# Patient Record
(demographics unavailable — no encounter records)

---

## 2025-03-24 NOTE — PAST MEDICAL HISTORY
[At Term] : at term [ Section] : by  section [Non-reassuring Fetal Status] : non-reassuring fetal status [Failure to Progress] : failure to progress [Age Appropriate] : age appropriate developmental milestones met [FreeTextEntry1] : 7lb 4oz [FreeTextEntry4] : emergency c/s

## 2025-03-24 NOTE — CONSULT LETTER
[Dear  ___] : Dear  [unfilled], [Consult Letter:] : I had the pleasure of evaluating your patient, [unfilled]. [Please see my note below.] : Please see my note below. [Sincerely,] : Sincerely, [FreeTextEntry3] : Paola Álvarez MD

## 2025-03-24 NOTE — PHYSICAL EXAM
[Healthy Appearing] : healthy appearing [Well Nourished] : well nourished [Interactive] : interactive [Normal Appearance] : normal appearance [Well formed] : well formed [Normally Set] : normally set [Normal S1 and S2] : normal S1 and S2 [Clear to Ausculation Bilaterally] : clear to auscultation bilaterally [Abdomen Soft] : soft [Abdomen Tenderness] : non-tender [] : no hepatosplenomegaly [Normal] : normal  [Murmur] : no murmurs [de-identified] : fidgety with his hands

## 2025-03-24 NOTE — HISTORY OF PRESENT ILLNESS
[Constipation] : no constipation [Sweating] : no sweating [Palpitations] : no palpitations [Nervousness] : no nervousness [Fatigue] : no fatigue [Abdominal Pain] : no abdominal pain [Weight Loss] : no weight loss [FreeTextEntry2] : Logan is a 12 year 6 month old boy with no PMH who was referred by his PCP for initial evaluation of abnormal TFTs. Labs were done on 1/18/25 for his annual physical - TSH was 0.36 mIU/L; FT4 1.4 ng/dL. TFTs were repeated on 2/8/25 TSH was 0.07 mIU/L, FT4 1.5 ng/dL. He had strep around the same time in February. Labs done at [facility]. Lipids, CMP, A1C were normal. Growth charts reviewed: weight has been stable around 20-30 percentile, height stable around 20-30 percentile. He denies any symptoms of hypo or hyperthyroidism, namely constipation, diarrhea, weight loss or weight gain, heat or cold intolerance, sweating, nervousness or palpitations or fatigue. PMH: <1 year had benign murmur, hasn't been back since  FH: MGM had thyroid cancer, thyroid removed and takes thyroid meds; no autoimmune disorders in the family. 7th grade

## 2025-03-24 NOTE — ASSESSMENT
[FreeTextEntry1] : Logan is a 12 year 6 month old boy with no PMH who was referred by his PCP for initial evaluation of abnormal TFTs. Logan was noted to have low and decreasing TSH levels. On 2/8/25 TSH was 0.07 mIU/L, FT4 1.5 ng/dL. Differential for his abnormal thyroid levels would include Graves disease, subacute thyroiditis or Delmis toxicosis. He could also be one of the 5% of individuals for whom TSH levels fall outside of the normal reference range, although this is less likely given the decrease in TSH levels noted. Should he have Graves disease, we would expect the presence of TSI/TRAb antibodies and would expect TSH to decrease with time and T4/T3/FT4 to increase with time. We will repeat TFTs today along with anti-thyroid antibodies and Graves' specific antibodies to determine the etiology of his thyroid function tests. Further work-up will depend on the results of these tests.